# Patient Record
Sex: FEMALE | Race: WHITE | NOT HISPANIC OR LATINO | URBAN - METROPOLITAN AREA
[De-identification: names, ages, dates, MRNs, and addresses within clinical notes are randomized per-mention and may not be internally consistent; named-entity substitution may affect disease eponyms.]

---

## 2017-02-13 ENCOUNTER — INPATIENT (INPATIENT)
Facility: HOSPITAL | Age: 57
LOS: 0 days | Discharge: HOME | DRG: 392 | End: 2017-02-14
Attending: INTERNAL MEDICINE | Admitting: INTERNAL MEDICINE
Payer: COMMERCIAL

## 2017-02-13 VITALS — SYSTOLIC BLOOD PRESSURE: 154 MMHG | HEART RATE: 110 BPM | RESPIRATION RATE: 20 BRPM | DIASTOLIC BLOOD PRESSURE: 70 MMHG

## 2017-02-13 DIAGNOSIS — R10.13 EPIGASTRIC PAIN: ICD-10-CM

## 2017-02-13 DIAGNOSIS — Z90.89 ACQUIRED ABSENCE OF OTHER ORGANS: Chronic | ICD-10-CM

## 2017-02-13 LAB
ALBUMIN SERPL ELPH-MCNC: 4.6 G/DL — SIGNIFICANT CHANGE UP (ref 3.3–5)
ALP SERPL-CCNC: 52 U/L — SIGNIFICANT CHANGE UP (ref 40–120)
ALT FLD-CCNC: 19 U/L RC — SIGNIFICANT CHANGE UP (ref 10–45)
ANION GAP SERPL CALC-SCNC: 16 MMOL/L — SIGNIFICANT CHANGE UP (ref 5–17)
APTT BLD: 51.2 SEC — HIGH (ref 27.5–37.4)
AST SERPL-CCNC: 34 U/L — SIGNIFICANT CHANGE UP (ref 10–40)
BASOPHILS # BLD AUTO: 0 K/UL — SIGNIFICANT CHANGE UP (ref 0–0.2)
BASOPHILS NFR BLD AUTO: 0.8 % — SIGNIFICANT CHANGE UP (ref 0–2)
BILIRUB SERPL-MCNC: 0.4 MG/DL — SIGNIFICANT CHANGE UP (ref 0.2–1.2)
BUN SERPL-MCNC: 17 MG/DL — SIGNIFICANT CHANGE UP (ref 7–23)
CALCIUM SERPL-MCNC: 9.6 MG/DL — SIGNIFICANT CHANGE UP (ref 8.4–10.5)
CHLORIDE SERPL-SCNC: 103 MMOL/L — SIGNIFICANT CHANGE UP (ref 96–108)
CK MB BLD-MCNC: 0.8 % — SIGNIFICANT CHANGE UP (ref 0–3.5)
CK MB CFR SERPL CALC: 1.1 NG/ML — SIGNIFICANT CHANGE UP (ref 0–3.8)
CK SERPL-CCNC: 139 U/L — SIGNIFICANT CHANGE UP (ref 25–170)
CO2 SERPL-SCNC: 24 MMOL/L — SIGNIFICANT CHANGE UP (ref 22–31)
CREAT SERPL-MCNC: 0.68 MG/DL — SIGNIFICANT CHANGE UP (ref 0.5–1.3)
EOSINOPHIL # BLD AUTO: 0.1 K/UL — SIGNIFICANT CHANGE UP (ref 0–0.5)
EOSINOPHIL NFR BLD AUTO: 1.1 % — SIGNIFICANT CHANGE UP (ref 0–6)
GAS PNL BLDV: SIGNIFICANT CHANGE UP
GLUCOSE SERPL-MCNC: 83 MG/DL — SIGNIFICANT CHANGE UP (ref 70–99)
HCT VFR BLD CALC: 42.1 % — SIGNIFICANT CHANGE UP (ref 34.5–45)
HGB BLD-MCNC: 14.4 G/DL — SIGNIFICANT CHANGE UP (ref 11.5–15.5)
INR BLD: 1.03 RATIO — SIGNIFICANT CHANGE UP (ref 0.88–1.16)
LIDOCAIN IGE QN: 51 U/L — SIGNIFICANT CHANGE UP (ref 7–60)
LYMPHOCYTES # BLD AUTO: 1.7 K/UL — SIGNIFICANT CHANGE UP (ref 1–3.3)
LYMPHOCYTES # BLD AUTO: 31.5 % — SIGNIFICANT CHANGE UP (ref 13–44)
MCHC RBC-ENTMCNC: 30.8 PG — SIGNIFICANT CHANGE UP (ref 27–34)
MCHC RBC-ENTMCNC: 34.2 GM/DL — SIGNIFICANT CHANGE UP (ref 32–36)
MCV RBC AUTO: 90 FL — SIGNIFICANT CHANGE UP (ref 80–100)
MONOCYTES # BLD AUTO: 0.3 K/UL — SIGNIFICANT CHANGE UP (ref 0–0.9)
MONOCYTES NFR BLD AUTO: 6.1 % — SIGNIFICANT CHANGE UP (ref 2–14)
NEUTROPHILS # BLD AUTO: 3.3 K/UL — SIGNIFICANT CHANGE UP (ref 1.8–7.4)
NEUTROPHILS NFR BLD AUTO: 60.4 % — SIGNIFICANT CHANGE UP (ref 43–77)
PLATELET # BLD AUTO: 200 K/UL — SIGNIFICANT CHANGE UP (ref 150–400)
POTASSIUM SERPL-MCNC: 5.5 MMOL/L — HIGH (ref 3.5–5.3)
POTASSIUM SERPL-SCNC: 5.5 MMOL/L — HIGH (ref 3.5–5.3)
PROT SERPL-MCNC: 7.8 G/DL — SIGNIFICANT CHANGE UP (ref 6–8.3)
PROTHROM AB SERPL-ACNC: 11.2 SEC — SIGNIFICANT CHANGE UP (ref 10–13.1)
RBC # BLD: 4.68 M/UL — SIGNIFICANT CHANGE UP (ref 3.8–5.2)
RBC # FLD: 11.8 % — SIGNIFICANT CHANGE UP (ref 10.3–14.5)
SODIUM SERPL-SCNC: 143 MMOL/L — SIGNIFICANT CHANGE UP (ref 135–145)
TROPONIN T SERPL-MCNC: <0.01 NG/ML — SIGNIFICANT CHANGE UP (ref 0–0.06)
WBC # BLD: 5.4 K/UL — SIGNIFICANT CHANGE UP (ref 3.8–10.5)
WBC # FLD AUTO: 5.4 K/UL — SIGNIFICANT CHANGE UP (ref 3.8–10.5)

## 2017-02-13 PROCEDURE — 71275 CT ANGIOGRAPHY CHEST: CPT | Mod: 26

## 2017-02-13 PROCEDURE — 74174 CTA ABD&PLVS W/CONTRAST: CPT | Mod: 26

## 2017-02-13 PROCEDURE — 99285 EMERGENCY DEPT VISIT HI MDM: CPT

## 2017-02-13 PROCEDURE — 71020: CPT | Mod: 26

## 2017-02-13 RX ORDER — SODIUM CHLORIDE 9 MG/ML
3 INJECTION INTRAMUSCULAR; INTRAVENOUS; SUBCUTANEOUS ONCE
Qty: 0 | Refills: 0 | Status: COMPLETED | OUTPATIENT
Start: 2017-02-13 | End: 2017-02-13

## 2017-02-13 RX ADMIN — Medication 1 MILLIGRAM(S): at 20:55

## 2017-02-13 RX ADMIN — SODIUM CHLORIDE 3 MILLILITER(S): 9 INJECTION INTRAMUSCULAR; INTRAVENOUS; SUBCUTANEOUS at 19:00

## 2017-02-13 NOTE — ED ADULT NURSE NOTE - FAMILY HISTORY
Father  Still living? Unknown  Family history of hypertension, Age at diagnosis: Age Unknown     Sibling  Still living? Yes, Estimated age: Age Unknown  Family history of lung cancer, Age at diagnosis: Age Unknown

## 2017-02-13 NOTE — ED PROVIDER NOTE - OBJECTIVE STATEMENT
PMD Alejandro Bingham    56 year old female otherwise healthy (hx of pericarditis 1 yr ago) presenting with substernal /  epigastric abdominal pain started suddenly 2 pm, moving lower down and became less bad at appx 5 pm. No hx of htn. No weakness of arms or legs. No sob. Non-exertional. Non-pleuritic. No fever or chills. Does have dry cough for a while. Has had lung and liver nodules that were watched. ?stool caliber changes. No blood / melena. PMD Alejandro Bingham    56 year old female otherwise healthy (hx of pericarditis 1 yr ago) presenting with substernal /  epigastric abdominal pain started suddenly 2 pm, moving lower down and became less bad at appx 5 pm. No hx of htn. No weakness of arms or legs. No sob. Non-exertional. Non-pleuritic. No fever or chills. Does have dry cough for a while. Has had lung and liver nodules that were watched. No stool caliber changes. No blood / melena.

## 2017-02-13 NOTE — ED ADULT NURSE NOTE - OBJECTIVE STATEMENT
56 year old female c/o epigastric abdominal pain started about 2pm, pain radiating lower down. Pain lessened  at approximately 5 pm.  No sob.  No fever or chills. Pt c/o dry cough for a while. No n/v/d.  Abd soft.  Extremities mobile.  No acute respiratory distress noted.

## 2017-02-13 NOTE — ED PROVIDER NOTE - PROGRESS NOTE DETAILS
Dr. Sampson received signout on this patient - cta negative - spoke with Dr. Bingham - asked for this patient to be admitted to his service. Xiang Sampson M.D.

## 2017-02-13 NOTE — ED PROVIDER NOTE - ATTENDING CONTRIBUTION TO CARE
56 year old female otherwise healthy (hx of pericarditis 1 yr ago) presenting with substernal /  epigastric abdominal pain started suddenly 2 pm, moving lower down and became less bad at appx 5 pm. No hx of htn. No weakness of arms or legs. No sob. Non-exertional. Non-pleuritic. No fever or chills. Does have dry cough for a while. Has had lung and liver nodules that were watched. No stool caliber changes. No blood / melena.  will get iv, labs, ce, lipase, analgesia prn, cxr, ct c/a/p and reassess  will delta trop, noted neg cta and patient informed and given results printed out.  Admission is offered to the patient, however the patient declines.  They would like to go home and follow up with their private physician.  No immediate life threatening issues present on history or clinical exam. Patient is a safe disposition home, has capacity and insight into their condition, ambulatory in the emergency department and will follow up with their doctor(s) this week. Patient and family  understand anticipatory guidance were given strict return and follow up precautions.  The patient and family have been informed of all concerning signs and symptoms to return to Emergency Department, the necessity to follow up with PMD/Clinic/follow up provided within 2-3 days was explained, and the patient and/or family reports understanding of above with capacity and insight.

## 2017-02-13 NOTE — ED PROVIDER NOTE - PHYSICAL EXAMINATION
Gen: well appearing, of stated age, no acute distress; Head: NC, AT; ENT: MMM, no uvular deviation; Neck: supple with full ROM; Chest: CTAB, no retractions, rate normal, appears to breath comfortable; Heart: RRR S1S2 No JVD No peripheral edema b/l pulses 2+ in arms and legs; Abd: Soft epigastric tender, no rebound or guarding, no masses, no pulsations, no bhatti sign, no mcburney tenderness, no CVAT; Back: No spinal deformity; Ext: Moving all 4 limbs without obvious impairment to ROM, no obvious weakness; Neuro: fluid speech, no focal deficits; Psych: No anxiety, depression or pressured speech noted; Skin: no utricaria, no diffuse rash. -ncohen

## 2017-02-13 NOTE — ED PROVIDER NOTE - MEDICAL DECISION MAKING DETAILS
concern for acs vs ao dissection. will check ct/cta. Pt is low risk for pe. If imaging wnl and ekg OK will consider cdu vs 4 hr trop. NL echo 1 yr ago, per pt also nl stress test at that time.

## 2017-02-13 NOTE — ED PROVIDER NOTE - PLAN OF CARE
Follow up with the Gastroenterology follow up provided, Gastroenterology [phone]. Follow up with the Gastroenterology follow up provided, Gastroenterology [phone].  Call Dr. Alejandro Bingham and arrange a follow up appointment this week, [phone].  Follow up with your medical doctor in 2-3 days or call our clinic at [phone] and state you were seen in the Emergency Department and would like to be seen in clinic.   Take Tylenol 1g every six hours and supplement with ibuprofen 600mg, with food, every six hours which can be taken three hours apart from the Tylenol to have a layered effect.  Drink at least 2 Liters or 64 Ounces of water each day.  Return for any persistent, worsening symptoms, or ANY concerns at all.

## 2017-02-13 NOTE — ED PROVIDER NOTE - CARE PLAN
Principal Discharge DX:	Epigastric pain  Secondary Diagnosis:	Chest pain Principal Discharge DX:	Epigastric pain  Instructions for follow-up, activity and diet:	Follow up with the Gastroenterology follow up provided, Gastroenterology [phone].  Secondary Diagnosis:	Chest pain Principal Discharge DX:	Epigastric pain  Instructions for follow-up, activity and diet:	Follow up with the Gastroenterology follow up provided, Gastroenterology [phone].  Call Dr. Alejandro Bingham and arrange a follow up appointment this week, [phone].  Follow up with your medical doctor in 2-3 days or call our clinic at [phone] and state you were seen in the Emergency Department and would like to be seen in clinic.   Take Tylenol 1g every six hours and supplement with ibuprofen 600mg, with food, every six hours which can be taken three hours apart from the Tylenol to have a layered effect.  Drink at least 2 Liters or 64 Ounces of water each day.  Return for any persistent, worsening symptoms, or ANY concerns at all.  Secondary Diagnosis:	Chest pain Principal Discharge DX:	Epigastric pain  Instructions for follow-up, activity and diet:	Follow up with the Gastroenterology follow up provided, Gastroenterology [phone].  Call Dr. Alejandro Bingham and arrange a follow up appointment this week, [phone].  Follow up with your medical doctor in 2-3 days or call our clinic at [phone] and state you were seen in the Emergency Department and would like to be seen in clinic.   Take Tylenol 1g every six hours and supplement with ibuprofen 600mg, with food, every six hours which can be taken three hours apart from the Tylenol to have a layered effect.  Drink at least 2 Liters or 64 Ounces of water each day.  Return for any persistent, worsening symptoms, or ANY concerns at all.  Secondary Diagnosis:	Chest pain Principal Discharge DX:	Epigastric pain  Instructions for follow-up, activity and diet:	Follow up with the Gastroenterology follow up provided, Gastroenterology [phone].  Call Dr. Alejandro Bingham and arrange a follow up appointment this week, [phone].  Follow up with your medical doctor in 2-3 days or call our clinic at [phone] and state you were seen in the Emergency Department and would like to be seen in clinic.   Take Tylenol 1g every six hours and supplement with ibuprofen 600mg, with food, every six hours which can be taken three hours apart from the Tylenol to have a layered effect.  Drink at least 2 Liters or 64 Ounces of water each day.  Return for any persistent, worsening symptoms, or ANY concerns at all.  Secondary Diagnosis:	Chest pain

## 2017-02-14 VITALS
RESPIRATION RATE: 18 BRPM | DIASTOLIC BLOOD PRESSURE: 83 MMHG | TEMPERATURE: 98 F | SYSTOLIC BLOOD PRESSURE: 118 MMHG | HEART RATE: 104 BPM | OXYGEN SATURATION: 96 %

## 2017-02-14 LAB
APPEARANCE UR: CLEAR — SIGNIFICANT CHANGE UP
BILIRUB UR-MCNC: NEGATIVE — SIGNIFICANT CHANGE UP
COLOR SPEC: YELLOW — SIGNIFICANT CHANGE UP
COMMENT - URINE: SIGNIFICANT CHANGE UP
DIFF PNL FLD: NEGATIVE — SIGNIFICANT CHANGE UP
EPI CELLS # UR: SIGNIFICANT CHANGE UP /HPF
GLUCOSE UR QL: NEGATIVE — SIGNIFICANT CHANGE UP
KETONES UR-MCNC: NEGATIVE — SIGNIFICANT CHANGE UP
LEUKOCYTE ESTERASE UR-ACNC: NEGATIVE — SIGNIFICANT CHANGE UP
NITRITE UR-MCNC: NEGATIVE — SIGNIFICANT CHANGE UP
PH UR: 7 — SIGNIFICANT CHANGE UP (ref 4.8–8)
PROT UR-MCNC: NEGATIVE — SIGNIFICANT CHANGE UP
SP GR SPEC: 1.01 — SIGNIFICANT CHANGE UP (ref 1.01–1.02)
TROPONIN T SERPL-MCNC: <0.01 NG/ML — SIGNIFICANT CHANGE UP (ref 0–0.06)
UROBILINOGEN FLD QL: NEGATIVE — SIGNIFICANT CHANGE UP
WBC UR QL: SIGNIFICANT CHANGE UP /HPF (ref 0–5)

## 2017-02-14 PROCEDURE — 85027 COMPLETE CBC AUTOMATED: CPT

## 2017-02-14 PROCEDURE — 71046 X-RAY EXAM CHEST 2 VIEWS: CPT

## 2017-02-14 PROCEDURE — 85730 THROMBOPLASTIN TIME PARTIAL: CPT

## 2017-02-14 PROCEDURE — 82553 CREATINE MB FRACTION: CPT

## 2017-02-14 PROCEDURE — 99284 EMERGENCY DEPT VISIT MOD MDM: CPT | Mod: 25

## 2017-02-14 PROCEDURE — 71275 CT ANGIOGRAPHY CHEST: CPT

## 2017-02-14 PROCEDURE — 93005 ELECTROCARDIOGRAM TRACING: CPT

## 2017-02-14 PROCEDURE — 81001 URINALYSIS AUTO W/SCOPE: CPT

## 2017-02-14 PROCEDURE — 84484 ASSAY OF TROPONIN QUANT: CPT

## 2017-02-14 PROCEDURE — 84132 ASSAY OF SERUM POTASSIUM: CPT

## 2017-02-14 PROCEDURE — 82330 ASSAY OF CALCIUM: CPT

## 2017-02-14 PROCEDURE — 82435 ASSAY OF BLOOD CHLORIDE: CPT

## 2017-02-14 PROCEDURE — 84295 ASSAY OF SERUM SODIUM: CPT

## 2017-02-14 PROCEDURE — 85610 PROTHROMBIN TIME: CPT

## 2017-02-14 PROCEDURE — 74174 CTA ABD&PLVS W/CONTRAST: CPT

## 2017-02-14 PROCEDURE — 83690 ASSAY OF LIPASE: CPT

## 2017-02-14 PROCEDURE — 82550 ASSAY OF CK (CPK): CPT

## 2017-02-14 PROCEDURE — 82803 BLOOD GASES ANY COMBINATION: CPT

## 2017-02-14 PROCEDURE — 83605 ASSAY OF LACTIC ACID: CPT

## 2017-02-14 PROCEDURE — 85014 HEMATOCRIT: CPT

## 2017-02-14 PROCEDURE — 82947 ASSAY GLUCOSE BLOOD QUANT: CPT

## 2017-02-14 PROCEDURE — 80053 COMPREHEN METABOLIC PANEL: CPT

## 2017-02-14 PROCEDURE — 96374 THER/PROPH/DIAG INJ IV PUSH: CPT | Mod: XU

## 2017-05-15 ENCOUNTER — APPOINTMENT (OUTPATIENT)
Dept: CARDIOLOGY | Facility: CLINIC | Age: 57
End: 2017-05-15

## 2017-05-15 ENCOUNTER — NON-APPOINTMENT (OUTPATIENT)
Age: 57
End: 2017-05-15

## 2017-05-15 VITALS
DIASTOLIC BLOOD PRESSURE: 80 MMHG | HEIGHT: 62 IN | HEART RATE: 77 BPM | BODY MASS INDEX: 19.32 KG/M2 | WEIGHT: 105 LBS | SYSTOLIC BLOOD PRESSURE: 119 MMHG | OXYGEN SATURATION: 98 %

## 2017-05-15 DIAGNOSIS — Z87.898 PERSONAL HISTORY OF OTHER SPECIFIED CONDITIONS: ICD-10-CM

## 2017-05-20 LAB
ALBUMIN SERPL ELPH-MCNC: 4.4 G/DL
ALP BLD-CCNC: 54 U/L
ALT SERPL-CCNC: 17 U/L
ANION GAP SERPL CALC-SCNC: 15 MMOL/L
AST SERPL-CCNC: 23 U/L
BASOPHILS # BLD AUTO: 0.02 K/UL
BASOPHILS NFR BLD AUTO: 0.4 %
BILIRUB SERPL-MCNC: 0.3 MG/DL
BUN SERPL-MCNC: 17 MG/DL
CALCIUM SERPL-MCNC: 9.6 MG/DL
CHLORIDE SERPL-SCNC: 101 MMOL/L
CHOLEST SERPL-MCNC: 209 MG/DL
CHOLEST/HDLC SERPL: 3.1 RATIO
CO2 SERPL-SCNC: 26 MMOL/L
CREAT SERPL-MCNC: 0.77 MG/DL
EOSINOPHIL # BLD AUTO: 0.1 K/UL
EOSINOPHIL NFR BLD AUTO: 2.2 %
GLUCOSE SERPL-MCNC: 72 MG/DL
HBA1C MFR BLD HPLC: 5.6 %
HCT VFR BLD CALC: 38.9 %
HDLC SERPL-MCNC: 68 MG/DL
HGB BLD-MCNC: 13 G/DL
IMM GRANULOCYTES NFR BLD AUTO: 0.2 %
LDLC SERPL CALC-MCNC: 127 MG/DL
LYMPHOCYTES # BLD AUTO: 1.83 K/UL
LYMPHOCYTES NFR BLD AUTO: 39.4 %
MAN DIFF?: NORMAL
MCHC RBC-ENTMCNC: 29.7 PG
MCHC RBC-ENTMCNC: 33.4 GM/DL
MCV RBC AUTO: 89 FL
MONOCYTES # BLD AUTO: 0.38 K/UL
MONOCYTES NFR BLD AUTO: 8.2 %
NEUTROPHILS # BLD AUTO: 2.3 K/UL
NEUTROPHILS NFR BLD AUTO: 49.6 %
PLATELET # BLD AUTO: 218 K/UL
POTASSIUM SERPL-SCNC: 4.1 MMOL/L
PROT SERPL-MCNC: 6.9 G/DL
RBC # BLD: 4.37 M/UL
RBC # FLD: 12.4 %
SODIUM SERPL-SCNC: 142 MMOL/L
TRIGL SERPL-MCNC: 69 MG/DL
TSH SERPL-ACNC: 2.01 UIU/ML
WBC # FLD AUTO: 4.64 K/UL

## 2017-05-24 ENCOUNTER — OUTPATIENT (OUTPATIENT)
Dept: OUTPATIENT SERVICES | Facility: HOSPITAL | Age: 57
LOS: 1 days | End: 2017-05-24
Payer: COMMERCIAL

## 2017-05-24 ENCOUNTER — APPOINTMENT (OUTPATIENT)
Dept: CV DIAGNOSITCS | Facility: HOSPITAL | Age: 57
End: 2017-05-24

## 2017-05-24 DIAGNOSIS — R07.9 CHEST PAIN, UNSPECIFIED: ICD-10-CM

## 2017-05-24 DIAGNOSIS — Z90.89 ACQUIRED ABSENCE OF OTHER ORGANS: Chronic | ICD-10-CM

## 2017-05-24 PROCEDURE — 93306 TTE W/DOPPLER COMPLETE: CPT | Mod: 26

## 2017-05-24 PROCEDURE — 93306 TTE W/DOPPLER COMPLETE: CPT

## 2017-05-26 ENCOUNTER — APPOINTMENT (OUTPATIENT)
Dept: CV DIAGNOSTICS | Facility: HOSPITAL | Age: 57
End: 2017-05-26

## 2017-08-03 ENCOUNTER — NON-APPOINTMENT (OUTPATIENT)
Age: 57
End: 2017-08-03

## 2017-08-03 ENCOUNTER — APPOINTMENT (OUTPATIENT)
Dept: CARDIOLOGY | Facility: CLINIC | Age: 57
End: 2017-08-03
Payer: COMMERCIAL

## 2017-08-03 VITALS
DIASTOLIC BLOOD PRESSURE: 83 MMHG | SYSTOLIC BLOOD PRESSURE: 119 MMHG | HEIGHT: 62 IN | BODY MASS INDEX: 19.88 KG/M2 | WEIGHT: 108 LBS | HEART RATE: 90 BPM | OXYGEN SATURATION: 97 %

## 2017-08-03 PROCEDURE — 99215 OFFICE O/P EST HI 40 MIN: CPT

## 2017-08-03 PROCEDURE — 93000 ELECTROCARDIOGRAM COMPLETE: CPT

## 2017-08-16 ENCOUNTER — OTHER (OUTPATIENT)
Age: 57
End: 2017-08-16

## 2017-08-21 ENCOUNTER — APPOINTMENT (OUTPATIENT)
Dept: CV DIAGNOSITCS | Facility: HOSPITAL | Age: 57
End: 2017-08-21

## 2017-08-21 ENCOUNTER — OUTPATIENT (OUTPATIENT)
Dept: OUTPATIENT SERVICES | Facility: HOSPITAL | Age: 57
LOS: 1 days | End: 2017-08-21
Payer: COMMERCIAL

## 2017-08-21 DIAGNOSIS — R07.9 CHEST PAIN, UNSPECIFIED: ICD-10-CM

## 2017-08-21 DIAGNOSIS — Z90.89 ACQUIRED ABSENCE OF OTHER ORGANS: Chronic | ICD-10-CM

## 2017-08-21 PROCEDURE — 93016 CV STRESS TEST SUPVJ ONLY: CPT

## 2017-08-21 PROCEDURE — 93351 STRESS TTE COMPLETE: CPT

## 2017-08-21 PROCEDURE — 93350 STRESS TTE ONLY: CPT | Mod: 26

## 2017-08-21 PROCEDURE — 93320 DOPPLER ECHO COMPLETE: CPT | Mod: 26

## 2017-08-21 PROCEDURE — 93320 DOPPLER ECHO COMPLETE: CPT

## 2017-08-21 PROCEDURE — 93018 CV STRESS TEST I&R ONLY: CPT

## 2017-08-21 PROCEDURE — 93325 DOPPLER ECHO COLOR FLOW MAPG: CPT

## 2017-08-21 PROCEDURE — 93325 DOPPLER ECHO COLOR FLOW MAPG: CPT | Mod: 26

## 2018-10-11 ENCOUNTER — APPOINTMENT (OUTPATIENT)
Dept: CARDIOLOGY | Facility: CLINIC | Age: 58
End: 2018-10-11
Payer: COMMERCIAL

## 2018-10-11 ENCOUNTER — NON-APPOINTMENT (OUTPATIENT)
Age: 58
End: 2018-10-11

## 2018-10-11 VITALS
BODY MASS INDEX: 19.32 KG/M2 | SYSTOLIC BLOOD PRESSURE: 136 MMHG | WEIGHT: 105 LBS | OXYGEN SATURATION: 98 % | HEIGHT: 62 IN | HEART RATE: 80 BPM | DIASTOLIC BLOOD PRESSURE: 72 MMHG

## 2018-10-11 PROCEDURE — 93000 ELECTROCARDIOGRAM COMPLETE: CPT

## 2018-10-11 PROCEDURE — 99214 OFFICE O/P EST MOD 30 MIN: CPT

## 2019-10-31 ENCOUNTER — APPOINTMENT (OUTPATIENT)
Dept: CARDIOLOGY | Facility: CLINIC | Age: 59
End: 2019-10-31
Payer: COMMERCIAL

## 2019-10-31 ENCOUNTER — NON-APPOINTMENT (OUTPATIENT)
Age: 59
End: 2019-10-31

## 2019-10-31 VITALS — OXYGEN SATURATION: 99 % | HEART RATE: 69 BPM | DIASTOLIC BLOOD PRESSURE: 75 MMHG | SYSTOLIC BLOOD PRESSURE: 125 MMHG

## 2019-10-31 PROCEDURE — 99214 OFFICE O/P EST MOD 30 MIN: CPT

## 2019-10-31 PROCEDURE — 93000 ELECTROCARDIOGRAM COMPLETE: CPT

## 2019-10-31 NOTE — DISCUSSION/SUMMARY
[FreeTextEntry1] : Patient is a 59 yo woman with h/o pericarditis (on medical therapy - years ago), intracranial bleed after an injury,  presented to follow up.\par - will refer for an echocardiogram\par - will refer for routine blood work\par - the patient was advised to keep herself hydrated\par - resutls of the stress echo were d/w the patient  - it appears that she has cardiac deconditioning. Advised to start HIIT training\par - Blood pressure is well controlled\par - ECG with no ischemic findings

## 2019-10-31 NOTE — HISTORY OF PRESENT ILLNESS
[FreeTextEntry1] : Patient is a 58 yo woman with h/o pericarditis (on medical therapy - years ago), intracranial bleed after an injury,  presented to follow up.\par \par Presently feels well - denies any new cardiac symtpoms at rest. When she performs high intensity cardiovascular exercise, she starts to have dizziness and palpitations. Patient does not keep herself well hydrated since she has been having bladder issues. Otherwise, does her strength exercise and yoga with no issues.

## 2019-10-31 NOTE — PHYSICAL EXAM
[General Appearance - Well Developed] : well developed [Normal Appearance] : normal appearance [Well Groomed] : well groomed [General Appearance - Well Nourished] : well nourished [No Deformities] : no deformities [General Appearance - In No Acute Distress] : no acute distress [Normal Conjunctiva] : the conjunctiva exhibited no abnormalities [Eyelids - No Xanthelasma] : the eyelids demonstrated no xanthelasmas [Normal Oral Mucosa] : normal oral mucosa [No Oral Pallor] : no oral pallor [No Oral Cyanosis] : no oral cyanosis [Normal Jugular Venous A Waves Present] : normal jugular venous A waves present [Normal Jugular Venous V Waves Present] : normal jugular venous V waves present [No Jugular Venous Carrasco A Waves] : no jugular venous carrasco A waves [Respiration, Rhythm And Depth] : normal respiratory rhythm and effort [Exaggerated Use Of Accessory Muscles For Inspiration] : no accessory muscle use [Auscultation Breath Sounds / Voice Sounds] : lungs were clear to auscultation bilaterally [Heart Rate And Rhythm] : heart rate and rhythm were normal [Heart Sounds] : normal S1 and S2 [Murmurs] : no murmurs present [Abdomen Soft] : soft [Abdomen Tenderness] : non-tender [Abdomen Mass (___ Cm)] : no abdominal mass palpated [Abnormal Walk] : normal gait [Gait - Sufficient For Exercise Testing] : the gait was sufficient for exercise testing [Nail Clubbing] : no clubbing of the fingernails [Cyanosis, Localized] : no localized cyanosis [Petechial Hemorrhages (___cm)] : no petechial hemorrhages [Skin Color & Pigmentation] : normal skin color and pigmentation [] : no rash [No Venous Stasis] : no venous stasis [Skin Lesions] : no skin lesions [No Skin Ulcers] : no skin ulcer [No Xanthoma] : no  xanthoma was observed [Oriented To Time, Place, And Person] : oriented to person, place, and time [Affect] : the affect was normal [Mood] : the mood was normal [No Anxiety] : not feeling anxious

## 2019-10-31 NOTE — REVIEW OF SYSTEMS
[Chest  Pressure] : chest pressure [Chest Pain] : chest pain [Palpitations] : palpitations [Negative] : Heme/Lymph

## 2019-11-01 LAB
ALBUMIN SERPL ELPH-MCNC: 4.5 G/DL
ALP BLD-CCNC: 60 U/L
ALT SERPL-CCNC: 10 U/L
ANION GAP SERPL CALC-SCNC: 10 MMOL/L
AST SERPL-CCNC: 17 U/L
BASOPHILS # BLD AUTO: 0.04 K/UL
BASOPHILS NFR BLD AUTO: 1.1 %
BILIRUB SERPL-MCNC: 0.6 MG/DL
BUN SERPL-MCNC: 23 MG/DL
CALCIUM SERPL-MCNC: 9.5 MG/DL
CHLORIDE SERPL-SCNC: 103 MMOL/L
CHOLEST SERPL-MCNC: 215 MG/DL
CHOLEST/HDLC SERPL: 2.8 RATIO
CO2 SERPL-SCNC: 28 MMOL/L
CREAT SERPL-MCNC: 0.62 MG/DL
EOSINOPHIL # BLD AUTO: 0.09 K/UL
EOSINOPHIL NFR BLD AUTO: 2.4 %
ESTIMATED AVERAGE GLUCOSE: 108 MG/DL
GLUCOSE SERPL-MCNC: 77 MG/DL
HBA1C MFR BLD HPLC: 5.4 %
HCT VFR BLD CALC: 40.7 %
HDLC SERPL-MCNC: 77 MG/DL
HGB BLD-MCNC: 13 G/DL
IMM GRANULOCYTES NFR BLD AUTO: 0.3 %
LDLC SERPL CALC-MCNC: 128 MG/DL
LYMPHOCYTES # BLD AUTO: 1.42 K/UL
LYMPHOCYTES NFR BLD AUTO: 37.6 %
MAN DIFF?: NORMAL
MCHC RBC-ENTMCNC: 29.5 PG
MCHC RBC-ENTMCNC: 31.9 GM/DL
MCV RBC AUTO: 92.5 FL
MONOCYTES # BLD AUTO: 0.35 K/UL
MONOCYTES NFR BLD AUTO: 9.3 %
NEUTROPHILS # BLD AUTO: 1.87 K/UL
NEUTROPHILS NFR BLD AUTO: 49.3 %
PLATELET # BLD AUTO: 226 K/UL
POTASSIUM SERPL-SCNC: 3.9 MMOL/L
PROT SERPL-MCNC: 6.8 G/DL
RBC # BLD: 4.4 M/UL
RBC # FLD: 12.2 %
SODIUM SERPL-SCNC: 141 MMOL/L
TRIGL SERPL-MCNC: 50 MG/DL
TSH SERPL-ACNC: 2.41 UIU/ML
WBC # FLD AUTO: 3.78 K/UL

## 2019-11-20 ENCOUNTER — OUTPATIENT (OUTPATIENT)
Dept: OUTPATIENT SERVICES | Facility: HOSPITAL | Age: 59
LOS: 1 days | End: 2019-11-20
Payer: COMMERCIAL

## 2019-11-20 ENCOUNTER — APPOINTMENT (OUTPATIENT)
Dept: CV DIAGNOSITCS | Facility: HOSPITAL | Age: 59
End: 2019-11-20

## 2019-11-20 DIAGNOSIS — Z90.89 ACQUIRED ABSENCE OF OTHER ORGANS: Chronic | ICD-10-CM

## 2019-11-20 DIAGNOSIS — R07.9 CHEST PAIN, UNSPECIFIED: ICD-10-CM

## 2019-11-20 PROCEDURE — 93306 TTE W/DOPPLER COMPLETE: CPT

## 2019-11-20 PROCEDURE — 93306 TTE W/DOPPLER COMPLETE: CPT | Mod: 26

## 2020-09-23 ENCOUNTER — APPOINTMENT (OUTPATIENT)
Dept: CV DIAGNOSITCS | Facility: HOSPITAL | Age: 60
End: 2020-09-23

## 2020-09-23 ENCOUNTER — OUTPATIENT (OUTPATIENT)
Dept: OUTPATIENT SERVICES | Facility: HOSPITAL | Age: 60
LOS: 1 days | End: 2020-09-23
Payer: COMMERCIAL

## 2020-09-23 DIAGNOSIS — Z90.89 ACQUIRED ABSENCE OF OTHER ORGANS: Chronic | ICD-10-CM

## 2020-09-23 DIAGNOSIS — I31.3 PERICARDIAL EFFUSION (NONINFLAMMATORY): ICD-10-CM

## 2020-09-23 PROCEDURE — 93306 TTE W/DOPPLER COMPLETE: CPT | Mod: 26

## 2020-09-23 PROCEDURE — 93306 TTE W/DOPPLER COMPLETE: CPT

## 2020-10-07 ENCOUNTER — APPOINTMENT (OUTPATIENT)
Dept: CARDIOLOGY | Facility: CLINIC | Age: 60
End: 2020-10-07
Payer: COMMERCIAL

## 2020-10-07 VITALS
OXYGEN SATURATION: 97 % | BODY MASS INDEX: 19.32 KG/M2 | HEIGHT: 62 IN | HEART RATE: 90 BPM | SYSTOLIC BLOOD PRESSURE: 113 MMHG | WEIGHT: 105 LBS | DIASTOLIC BLOOD PRESSURE: 73 MMHG

## 2020-10-07 PROCEDURE — 93000 ELECTROCARDIOGRAM COMPLETE: CPT

## 2020-10-07 PROCEDURE — 99215 OFFICE O/P EST HI 40 MIN: CPT

## 2020-10-07 NOTE — PHYSICAL EXAM
[General Appearance - Well Developed] : well developed [Normal Appearance] : normal appearance [Well Groomed] : well groomed [General Appearance - Well Nourished] : well nourished [No Deformities] : no deformities [General Appearance - In No Acute Distress] : no acute distress [Normal Conjunctiva] : the conjunctiva exhibited no abnormalities [Eyelids - No Xanthelasma] : the eyelids demonstrated no xanthelasmas [Normal Oral Mucosa] : normal oral mucosa [No Oral Pallor] : no oral pallor [No Oral Cyanosis] : no oral cyanosis [Normal Jugular Venous A Waves Present] : normal jugular venous A waves present [Normal Jugular Venous V Waves Present] : normal jugular venous V waves present [No Jugular Venous Carrasco A Waves] : no jugular venous carrasco A waves [Respiration, Rhythm And Depth] : normal respiratory rhythm and effort [Exaggerated Use Of Accessory Muscles For Inspiration] : no accessory muscle use [Auscultation Breath Sounds / Voice Sounds] : lungs were clear to auscultation bilaterally [Heart Rate And Rhythm] : heart rate and rhythm were normal [Heart Sounds] : normal S1 and S2 [Murmurs] : no murmurs present [Abdomen Soft] : soft [Abdomen Tenderness] : non-tender [Abdomen Mass (___ Cm)] : no abdominal mass palpated [Abnormal Walk] : normal gait [Gait - Sufficient For Exercise Testing] : the gait was sufficient for exercise testing [Nail Clubbing] : no clubbing of the fingernails [Cyanosis, Localized] : no localized cyanosis [Petechial Hemorrhages (___cm)] : no petechial hemorrhages [Skin Color & Pigmentation] : normal skin color and pigmentation [] : no rash [No Venous Stasis] : no venous stasis [Skin Lesions] : no skin lesions [No Skin Ulcers] : no skin ulcer [No Xanthoma] : no  xanthoma was observed [Oriented To Time, Place, And Person] : oriented to person, place, and time [Affect] : the affect was normal [Mood] : the mood was normal [No Anxiety] : not feeling anxious [Normal] : normal [Normal Rate] : normal [Rhythm Regular] : regular [Normal S1] : normal S1 [Normal S2] : normal S2

## 2020-10-08 LAB
ALBUMIN SERPL ELPH-MCNC: 4.7 G/DL
ALP BLD-CCNC: 41 U/L
ALT SERPL-CCNC: 11 U/L
ANION GAP SERPL CALC-SCNC: 12 MMOL/L
AST SERPL-CCNC: 16 U/L
BASOPHILS # BLD AUTO: 0.04 K/UL
BASOPHILS NFR BLD AUTO: 1 %
BILIRUB SERPL-MCNC: 0.7 MG/DL
BUN SERPL-MCNC: 21 MG/DL
CALCIUM SERPL-MCNC: 9.5 MG/DL
CHLORIDE SERPL-SCNC: 103 MMOL/L
CHOLEST SERPL-MCNC: 205 MG/DL
CHOLEST/HDLC SERPL: 2.7 RATIO
CO2 SERPL-SCNC: 27 MMOL/L
CREAT SERPL-MCNC: 0.59 MG/DL
EOSINOPHIL # BLD AUTO: 0.09 K/UL
EOSINOPHIL NFR BLD AUTO: 2.2 %
GLUCOSE SERPL-MCNC: 81 MG/DL
HCT VFR BLD CALC: 40.9 %
HDLC SERPL-MCNC: 76 MG/DL
HGB BLD-MCNC: 13.1 G/DL
IMM GRANULOCYTES NFR BLD AUTO: 0 %
LDLC SERPL CALC-MCNC: 118 MG/DL
LYMPHOCYTES # BLD AUTO: 1.48 K/UL
LYMPHOCYTES NFR BLD AUTO: 36.7 %
MAN DIFF?: NORMAL
MCHC RBC-ENTMCNC: 29.8 PG
MCHC RBC-ENTMCNC: 32 GM/DL
MCV RBC AUTO: 93.2 FL
MONOCYTES # BLD AUTO: 0.36 K/UL
MONOCYTES NFR BLD AUTO: 8.9 %
NEUTROPHILS # BLD AUTO: 2.06 K/UL
NEUTROPHILS NFR BLD AUTO: 51.2 %
PLATELET # BLD AUTO: 231 K/UL
POTASSIUM SERPL-SCNC: 4.1 MMOL/L
PROT SERPL-MCNC: 6.8 G/DL
RBC # BLD: 4.39 M/UL
RBC # FLD: 12.4 %
SODIUM SERPL-SCNC: 142 MMOL/L
TRIGL SERPL-MCNC: 60 MG/DL
TSH SERPL-ACNC: 3.12 UIU/ML
WBC # FLD AUTO: 4.03 K/UL

## 2020-10-08 NOTE — HISTORY OF PRESENT ILLNESS
[FreeTextEntry1] : Patient is a 61 yo woman with h/o pericarditis (on medical therapy - years ago), intracranial bleed after an injury,  presented to follow up.\par \par Presently feels well - occ feels SOB when she has chest pain/ discomfort especially when she is lying down relieved with Motrin. \par \par TTE - small pericardial effusion ( Prior reading was trace effusion 2017). \par Was advised to Motrin but has not started taking yet.

## 2020-10-08 NOTE — DISCUSSION/SUMMARY
[FreeTextEntry1] : Patient is a 61 yo woman with h/o pericarditis (on medical therapy - years ago), intracranial bleed after an injury,  presented to follow up. TTE with persistent pericardial effusion. - Blood pressure is well controlled\par - ECG with no ischemic findings\par \par Pericardial effusion - \par -  Motrin - 400mg bid x 3 days \par Motrin 200mg bid x 3 days \par Motrin 100mg bid x 3 days \par Motrin 100mg QD x 3 days \par \par 2) Follow up TTE to evaluate pericardial effusion in 4 weeks. \par  \par

## 2020-11-05 ENCOUNTER — OUTPATIENT (OUTPATIENT)
Dept: OUTPATIENT SERVICES | Facility: HOSPITAL | Age: 60
LOS: 1 days | End: 2020-11-05
Payer: COMMERCIAL

## 2020-11-05 ENCOUNTER — APPOINTMENT (OUTPATIENT)
Dept: CV DIAGNOSITCS | Facility: HOSPITAL | Age: 60
End: 2020-11-05

## 2020-11-05 DIAGNOSIS — Z90.89 ACQUIRED ABSENCE OF OTHER ORGANS: Chronic | ICD-10-CM

## 2020-11-05 DIAGNOSIS — I31.3 PERICARDIAL EFFUSION (NONINFLAMMATORY): ICD-10-CM

## 2020-11-05 PROCEDURE — 93306 TTE W/DOPPLER COMPLETE: CPT

## 2020-11-05 PROCEDURE — 93306 TTE W/DOPPLER COMPLETE: CPT | Mod: 26

## 2021-05-26 ENCOUNTER — NON-APPOINTMENT (OUTPATIENT)
Age: 61
End: 2021-05-26

## 2021-05-26 ENCOUNTER — APPOINTMENT (OUTPATIENT)
Dept: CARDIOLOGY | Facility: CLINIC | Age: 61
End: 2021-05-26

## 2021-05-26 ENCOUNTER — APPOINTMENT (OUTPATIENT)
Dept: CARDIOLOGY | Facility: CLINIC | Age: 61
End: 2021-05-26
Payer: COMMERCIAL

## 2021-05-26 VITALS
SYSTOLIC BLOOD PRESSURE: 126 MMHG | HEIGHT: 62 IN | BODY MASS INDEX: 19.88 KG/M2 | WEIGHT: 108 LBS | HEART RATE: 85 BPM | DIASTOLIC BLOOD PRESSURE: 84 MMHG | OXYGEN SATURATION: 97 %

## 2021-05-26 PROCEDURE — 93000 ELECTROCARDIOGRAM COMPLETE: CPT

## 2021-05-26 PROCEDURE — 99215 OFFICE O/P EST HI 40 MIN: CPT

## 2021-05-26 PROCEDURE — 99072 ADDL SUPL MATRL&STAF TM PHE: CPT

## 2021-05-26 NOTE — PHYSICAL EXAM
[General Appearance - Well Developed] : well developed [Normal Appearance] : normal appearance [Well Groomed] : well groomed [General Appearance - Well Nourished] : well nourished [No Deformities] : no deformities [General Appearance - In No Acute Distress] : no acute distress [Normal Conjunctiva] : the conjunctiva exhibited no abnormalities [Eyelids - No Xanthelasma] : the eyelids demonstrated no xanthelasmas [Normal Oral Mucosa] : normal oral mucosa [No Oral Pallor] : no oral pallor [No Oral Cyanosis] : no oral cyanosis [Normal Jugular Venous A Waves Present] : normal jugular venous A waves present [Normal Jugular Venous V Waves Present] : normal jugular venous V waves present [No Jugular Venous Carrasco A Waves] : no jugular venous carrasco A waves [Respiration, Rhythm And Depth] : normal respiratory rhythm and effort [Exaggerated Use Of Accessory Muscles For Inspiration] : no accessory muscle use [Auscultation Breath Sounds / Voice Sounds] : lungs were clear to auscultation bilaterally [Normal] : normal [Normal Rate] : normal [Rhythm Regular] : regular [Normal S1] : normal S1 [Normal S2] : normal S2 [Abdomen Soft] : soft [Abdomen Tenderness] : non-tender [Abdomen Mass (___ Cm)] : no abdominal mass palpated [Abnormal Walk] : normal gait [Gait - Sufficient For Exercise Testing] : the gait was sufficient for exercise testing [Nail Clubbing] : no clubbing of the fingernails [Cyanosis, Localized] : no localized cyanosis [Petechial Hemorrhages (___cm)] : no petechial hemorrhages [Skin Color & Pigmentation] : normal skin color and pigmentation [] : no rash [No Venous Stasis] : no venous stasis [Skin Lesions] : no skin lesions [No Skin Ulcers] : no skin ulcer [No Xanthoma] : no  xanthoma was observed [Oriented To Time, Place, And Person] : oriented to person, place, and time [Affect] : the affect was normal [Mood] : the mood was normal [No Anxiety] : not feeling anxious

## 2021-05-26 NOTE — DISCUSSION/SUMMARY
[FreeTextEntry1] : Patient is a 60 yo woman with h/o pericarditis (on medical therapy - years ago), intracranial bleed after an injury,  presented to follow up. TTE with persistent pericardial effusion. - Blood pressure is well controlled\par - ECG with no ischemic findings\par \par Pericardial effusion - \par -  Motrin - 400mg bid x 3 days \par Motrin 200mg bid x 3 days \par Motrin 100mg bid x 3 days \par Motrin 100mg QD x 3 days \par \par 2) Follow up TTE to evaluate pericardial effusion in 4 weeks. \par  \par

## 2021-05-26 NOTE — HISTORY OF PRESENT ILLNESS
[FreeTextEntry1] : Patient is a 60 yo woman with h/o pericarditis (on medical therapy - years ago), intracranial bleed after an injury,  presented to follow up.\par \par Presently feels well - occ feels SOB when she has chest pain/ discomfort especially when she is lying down relieved with Motrin. \par \par TTE - small pericardial effusion ( Prior reading was trace effusion 2017). \par Was advised to Motrin but has not started taking yet.

## 2021-12-09 ENCOUNTER — APPOINTMENT (OUTPATIENT)
Dept: ELECTROPHYSIOLOGY | Facility: CLINIC | Age: 61
End: 2021-12-09
Payer: COMMERCIAL

## 2021-12-09 ENCOUNTER — NON-APPOINTMENT (OUTPATIENT)
Age: 61
End: 2021-12-09

## 2021-12-09 ENCOUNTER — APPOINTMENT (OUTPATIENT)
Dept: CARDIOLOGY | Facility: CLINIC | Age: 61
End: 2021-12-09
Payer: COMMERCIAL

## 2021-12-09 VITALS
DIASTOLIC BLOOD PRESSURE: 87 MMHG | HEART RATE: 81 BPM | BODY MASS INDEX: 19.88 KG/M2 | SYSTOLIC BLOOD PRESSURE: 125 MMHG | WEIGHT: 108 LBS | OXYGEN SATURATION: 99 % | HEIGHT: 62 IN

## 2021-12-09 DIAGNOSIS — Z00.00 ENCOUNTER FOR GENERAL ADULT MEDICAL EXAMINATION W/OUT ABNORMAL FINDINGS: ICD-10-CM

## 2021-12-09 DIAGNOSIS — R03.0 ELEVATED BLOOD-PRESSURE READING, W/OUT DIAGNOSIS OF HYPERTENSION: ICD-10-CM

## 2021-12-09 PROCEDURE — 93000 ELECTROCARDIOGRAM COMPLETE: CPT

## 2021-12-09 PROCEDURE — 99213 OFFICE O/P EST LOW 20 MIN: CPT

## 2021-12-10 NOTE — DISCUSSION/SUMMARY
[FreeTextEntry1] :  61 year old woman with h/o pericarditis (on medical therapy - years ago), intracranial bleed after an injury. Remote history of pericarditis. \par Presented for cardiac follow up. Complaining of intermittent palpitations and occasional shortness of breath since receiving the Moderna vaccine. \par \par # intermittent palpitations\par    Placed a 7 day Zio holter patch to assess palpitation symptoms\par    Repeat echocardiogram to rule out repeat collection of pericardial fluid\par \par Most recent labs drawn one year ago.\par Repeating full panel: CBC, CMP, Hgb A1C, TSH, Lipid panel\par \par Encouraged patient to exercise 150 minutes per week\par \par Follow up post testing\par \par    \par

## 2021-12-10 NOTE — HISTORY OF PRESENT ILLNESS
[FreeTextEntry1] : Patient is a 62 yo woman with h/o pericarditis (on medical therapy - years ago), intracranial bleed after an injury,  presented to follow up.\par \par Presently feels well - occ feels SOB when she has chest pain/ discomfort especially when she is lying down relieved with Motrin. \par \par TTE - small pericardial effusion ( Prior reading was trace effusion 2017). \par Was advised to Motrin but has not started taking yet. \par \par Interval Note\par December 9, 2021\par \par 61  year old female with remote history of pericarditis. [facility].\par \par Blood pressures today is normotensive\par EKG - demonstrates sinus rhythm with low voltage, EKG is stable and unchanged from prior EKG one year ago. \par \par Patient reports that since she received the Moderna booster 6 weeks ago and  has been experiencing some palpations and dyspnea on exertion since that  time.  \par \par Otherwise, feels ok and denies symptoms of chest pain, dizziness or syncope.

## 2021-12-23 PROCEDURE — 93244 EXT ECG>48HR<7D REV&INTERPJ: CPT

## 2022-01-04 ENCOUNTER — APPOINTMENT (OUTPATIENT)
Dept: CARDIOLOGY | Facility: CLINIC | Age: 62
End: 2022-01-04
Payer: COMMERCIAL

## 2022-01-04 DIAGNOSIS — R00.2 PALPITATIONS: ICD-10-CM

## 2022-01-04 DIAGNOSIS — I31.3 PERICARDIAL EFFUSION (NONINFLAMMATORY): ICD-10-CM

## 2022-01-04 DIAGNOSIS — R07.9 CHEST PAIN, UNSPECIFIED: ICD-10-CM

## 2022-01-04 DIAGNOSIS — R06.00 DYSPNEA, UNSPECIFIED: ICD-10-CM

## 2022-01-04 PROCEDURE — 93306 TTE W/DOPPLER COMPLETE: CPT

## 2022-01-06 PROBLEM — R03.0 ELEVATED BLOOD PRESSURE READING WITHOUT DIAGNOSIS OF HYPERTENSION: Status: ACTIVE | Noted: 2022-01-06

## 2022-01-21 PROBLEM — R00.2 PALPITATIONS: Status: ACTIVE | Noted: 2017-05-15

## 2022-01-21 PROBLEM — R06.00 DYSPNEA: Status: ACTIVE | Noted: 2017-05-16

## 2022-01-21 PROBLEM — R07.9 CHEST PAIN: Status: ACTIVE | Noted: 2017-05-15

## 2022-01-21 PROBLEM — I31.3 PERICARDIAL EFFUSION: Status: ACTIVE | Noted: 2020-09-10

## 2022-06-09 ENCOUNTER — APPOINTMENT (OUTPATIENT)
Dept: ORTHOPEDIC SURGERY | Facility: CLINIC | Age: 62
End: 2022-06-09
Payer: COMMERCIAL

## 2022-06-09 VITALS — BODY MASS INDEX: 19.88 KG/M2 | HEIGHT: 62 IN | WEIGHT: 108 LBS

## 2022-06-09 DIAGNOSIS — M81.0 AGE-RELATED OSTEOPOROSIS W/OUT CURRENT PATHOLOGICAL FRACTURE: ICD-10-CM

## 2022-06-09 PROCEDURE — 99203 OFFICE O/P NEW LOW 30 MIN: CPT

## 2022-06-09 RX ORDER — MELOXICAM 15 MG/1
15 TABLET ORAL DAILY
Qty: 30 | Refills: 1 | Status: ACTIVE | COMMUNITY
Start: 2022-06-09 | End: 1900-01-01

## 2022-06-09 NOTE — HISTORY OF PRESENT ILLNESS
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 \par Pt denies numbness to the LLE. \par \par PMH: Osteoporosis.\par Allergies: Bactrim (rash) [] : Post Surgical Visit: no [FreeTextEntry1] : groin/left knee [FreeTextEntry5] : no recent injury. Patient is here today for ongoing pain in the left knee/groin. Pain worsens when patient is walking. Patient has a CT Scan done 2 weeks ago. [de-identified] : CT Scan [de-identified] : none

## 2022-06-09 NOTE — PHYSICAL EXAM
[de-identified] : Gait is mildly antalgic to the left.\par There is left groin pain noted with IROM.\par Mildly limited strength to the left hip upon flexion and adduction.\par There is TTP over the adductor muscle region.\par No pain with pelvic compression.\par Femoral Stretch Test produces mild groin pain.\par No TTP over the SI joints or left GT region. \par SLR tests are negative. \par Lower extremities are nvi with 5/5 strength.

## 2022-07-26 ENCOUNTER — APPOINTMENT (OUTPATIENT)
Dept: ORTHOPEDIC SURGERY | Facility: CLINIC | Age: 62
End: 2022-07-26

## 2022-07-26 VITALS — WEIGHT: 108 LBS | HEIGHT: 62 IN | BODY MASS INDEX: 19.88 KG/M2

## 2022-07-26 DIAGNOSIS — M76.892 OTHER SPECIFIED ENTHESOPATHIES OF LEFT LOWER LIMB, EXCLUDING FOOT: ICD-10-CM

## 2022-07-26 DIAGNOSIS — M25.552 PAIN IN LEFT HIP: ICD-10-CM

## 2022-07-26 PROCEDURE — 99213 OFFICE O/P EST LOW 20 MIN: CPT

## 2022-07-26 RX ORDER — DIAZEPAM 5 MG/1
5 TABLET ORAL
Qty: 2 | Refills: 0 | Status: ACTIVE | COMMUNITY
Start: 2022-07-26 | End: 1900-01-01

## 2022-07-26 RX ORDER — MELOXICAM 15 MG/1
15 TABLET ORAL DAILY
Qty: 30 | Refills: 2 | Status: ACTIVE | COMMUNITY
Start: 2022-07-26 | End: 1900-01-01

## 2022-07-26 NOTE — ASSESSMENT
[FreeTextEntry1] : Pt has not significantly improved with formal PT and nsaid and continues to be limited with ADLs.\par Will refer for MRI of the left hip for further assessment. \par Pt will rto with Dr. Flowers s/p MRI to discuss results/tx options.\par Pt provided refill of Mobic 15 mg/d prn pain, as well as Valium 5 mg 1-2 to be taken 1-2 hrs prior to MRI.\par Pt is aware that she is not to drive while on this medication.

## 2022-07-26 NOTE — HISTORY OF PRESENT ILLNESS
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 Pt states that she continues to have difficulty walking due (> 10 minutes) to this discomfort. \par \par 6/9/2022: Pt has note difficulty ambulating over the past 9 weeks due to left groin/leg pain.\par There is no hx of trauma. \par Pt denies numbness to the LLE. \par \par PMH: Osteoporosis.\par Allergies: Bactrim (rash) [] : Post Surgical Visit: no [FreeTextEntry1] : groin/left knee [FreeTextEntry5] : no recent injury. Patient is here today for ongoing pain in the left knee/groin. Pain worsens when patient is walking. Patient has a CT Scan done 2 weeks ago. [de-identified] : CT Scan [de-identified] : none

## 2022-07-26 NOTE — IMAGING
[de-identified] : Gait is mildly antalgic to the left.\par There is left groin pain noted with IROM and with adduction.\par Mildly limited strength to the left hip upon flexion and adduction.\par There is + TTP over the adductor muscle region proximally.\par No pain with pelvic compression.\par Femoral Stretch Test produces mild groin pain.\par No TTP over the SI joints or left GT region. \par SLR tests are negative. \par Lower extremities are nvi with 5/5 strength.

## 2022-08-10 ENCOUNTER — FORM ENCOUNTER (OUTPATIENT)
Age: 62
End: 2022-08-10

## 2022-08-11 ENCOUNTER — APPOINTMENT (OUTPATIENT)
Dept: MRI IMAGING | Facility: CLINIC | Age: 62
End: 2022-08-11

## 2022-08-11 PROCEDURE — 73721 MRI JNT OF LWR EXTRE W/O DYE: CPT | Mod: LT

## 2022-08-15 ENCOUNTER — APPOINTMENT (OUTPATIENT)
Dept: ORTHOPEDIC SURGERY | Facility: CLINIC | Age: 62
End: 2022-08-15

## 2022-08-15 VITALS — WEIGHT: 108 LBS | HEIGHT: 62 IN | BODY MASS INDEX: 19.88 KG/M2

## 2022-08-15 DIAGNOSIS — M16.12 UNILATERAL PRIMARY OSTEOARTHRITIS, LEFT HIP: ICD-10-CM

## 2022-08-15 DIAGNOSIS — S73.192S OTHER SPRAIN OF LEFT HIP, SEQUELA: ICD-10-CM

## 2022-08-15 PROCEDURE — 99214 OFFICE O/P EST MOD 30 MIN: CPT

## 2022-08-15 PROCEDURE — 99213 OFFICE O/P EST LOW 20 MIN: CPT

## 2022-08-15 NOTE — HISTORY OF PRESENT ILLNESS
[6] : 6 [2] : 2 [Dull/Aching] : dull/aching [de-identified] : 62F p/w L hip pain. She has been having pain in her groin since April, been doing PT without relief. She has not been using any NSAIDs. Originally seen by Dr Trent who sent her for an MRI. She notes i has gotten over the last few days from inactivity. [] : Post Surgical Visit: no [FreeTextEntry1] : left hip  [de-identified] : pt

## 2022-08-15 NOTE — ASSESSMENT
[FreeTextEntry1] : 62F p/w mild L hip OA and labrum tear\par \par Continue HEP and NSAIDs\par Discussed possible IA hip inj\par return 2-3mo if no improvement

## 2023-01-18 ENCOUNTER — APPOINTMENT (OUTPATIENT)
Dept: UROGYNECOLOGY | Facility: CLINIC | Age: 63
End: 2023-01-18

## 2023-05-15 ENCOUNTER — APPOINTMENT (OUTPATIENT)
Dept: CARDIOLOGY | Facility: CLINIC | Age: 63
End: 2023-05-15

## 2023-05-15 NOTE — DISCUSSION/SUMMARY
[FreeTextEntry1] :  61 year old woman with h/o pericarditis (on medical therapy - years ago), intracranial bleed after an injury. Remote history of pericarditis. \par    \par

## 2023-05-15 NOTE — HISTORY OF PRESENT ILLNESS
[FreeTextEntry1] : Patient is a 60 yo woman with h/o pericarditis (on medical therapy - years ago), intracranial bleed after an injury,  presented to follow up.\par \par Presently feels well - occ feels SOB when she has chest pain/ discomfort especially when she is lying down relieved with Motrin. \par \par TTE - small pericardial effusion ( Prior reading was trace effusion 2017). \par Was advised to Motrin but has not started taking yet. \par \par Interval note\par May 15, 2023\par Tiffany chaudhry is a 63 year old female with PMHx pericarditis, intracranial bleed s/p injury. she presents to  program for  her 6 months CV follow up visit.\par  BP\par ECG\par LABS:\par \par Interval Note\par December 9, 2021\par \par 61  year old female with remote history of pericarditis. [facility].\par \par Blood pressures today is normotensive\par EKG - demonstrates sinus rhythm with low voltage, EKG is stable and unchanged from prior EKG one year ago. \par \par Patient reports that since she received the Moderna booster 6 weeks ago and  has been experiencing some palpations and dyspnea on exertion since that  time.  \par \par Otherwise, feels ok and denies symptoms of chest pain, dizziness or syncope.

## 2023-07-27 DIAGNOSIS — J45.909 UNSPECIFIED ASTHMA, UNCOMPLICATED: ICD-10-CM

## 2023-07-27 RX ORDER — ALBUTEROL SULFATE 90 UG/1
108 (90 BASE) INHALANT RESPIRATORY (INHALATION) EVERY 4 HOURS
Refills: 0 | Status: ACTIVE | COMMUNITY
Start: 2023-07-27

## 2023-07-27 RX ORDER — AMLODIPINE BESYLATE 2.5 MG/1
2.5 TABLET ORAL
Qty: 30 | Refills: 3 | Status: DISCONTINUED | COMMUNITY
Start: 2022-01-06 | End: 2023-07-27

## 2023-08-01 ENCOUNTER — APPOINTMENT (OUTPATIENT)
Dept: ELECTROPHYSIOLOGY | Facility: CLINIC | Age: 63
End: 2023-08-01
Payer: COMMERCIAL

## 2023-08-02 ENCOUNTER — APPOINTMENT (OUTPATIENT)
Dept: UROGYNECOLOGY | Facility: CLINIC | Age: 63
End: 2023-08-02
Payer: COMMERCIAL

## 2023-08-02 VITALS
HEART RATE: 76 BPM | BODY MASS INDEX: 20.24 KG/M2 | DIASTOLIC BLOOD PRESSURE: 85 MMHG | HEIGHT: 62 IN | SYSTOLIC BLOOD PRESSURE: 123 MMHG | WEIGHT: 110 LBS

## 2023-08-02 DIAGNOSIS — Z87.09 PERSONAL HISTORY OF OTHER DISEASES OF THE RESPIRATORY SYSTEM: ICD-10-CM

## 2023-08-02 DIAGNOSIS — Z82.49 FAMILY HISTORY OF ISCHEMIC HEART DISEASE AND OTHER DISEASES OF THE CIRCULATORY SYSTEM: ICD-10-CM

## 2023-08-02 DIAGNOSIS — Z82.5 FAMILY HISTORY OF ASTHMA AND OTHER CHRONIC LOWER RESPIRATORY DISEASES: ICD-10-CM

## 2023-08-02 DIAGNOSIS — I51.9 HEART DISEASE, UNSPECIFIED: ICD-10-CM

## 2023-08-02 DIAGNOSIS — Z80.1 FAMILY HISTORY OF MALIGNANT NEOPLASM OF TRACHEA, BRONCHUS AND LUNG: ICD-10-CM

## 2023-08-02 DIAGNOSIS — Z87.828 PERSONAL HISTORY OF OTHER (HEALED) PHYSICAL INJURY AND TRAUMA: ICD-10-CM

## 2023-08-02 DIAGNOSIS — Z78.9 OTHER SPECIFIED HEALTH STATUS: ICD-10-CM

## 2023-08-02 DIAGNOSIS — R35.0 FREQUENCY OF MICTURITION: ICD-10-CM

## 2023-08-02 DIAGNOSIS — N39.41 URGE INCONTINENCE: ICD-10-CM

## 2023-08-02 DIAGNOSIS — R39.15 URGENCY OF URINATION: ICD-10-CM

## 2023-08-02 DIAGNOSIS — Z87.19 PERSONAL HISTORY OF OTHER DISEASES OF THE DIGESTIVE SYSTEM: ICD-10-CM

## 2023-08-02 DIAGNOSIS — N95.2 POSTMENOPAUSAL ATROPHIC VAGINITIS: ICD-10-CM

## 2023-08-02 LAB
BILIRUB UR QL STRIP: NORMAL
CLARITY UR: CLEAR
COLLECTION METHOD: NORMAL
GLUCOSE UR-MCNC: NORMAL
HCG UR QL: 0.2 EU/DL
HGB UR QL STRIP.AUTO: NORMAL
KETONES UR-MCNC: NORMAL
LEUKOCYTE ESTERASE UR QL STRIP: NORMAL
NITRITE UR QL STRIP: NORMAL
PH UR STRIP: 7
PROT UR STRIP-MCNC: NORMAL
SP GR UR STRIP: 1.01

## 2023-08-02 PROCEDURE — 51701 INSERT BLADDER CATHETER: CPT

## 2023-08-02 PROCEDURE — 99204 OFFICE O/P NEW MOD 45 MIN: CPT | Mod: 25

## 2023-08-02 RX ORDER — MIRABEGRON 50 MG/1
50 TABLET, FILM COATED, EXTENDED RELEASE ORAL
Qty: 30 | Refills: 6 | Status: ACTIVE | COMMUNITY
Start: 2023-08-02 | End: 1900-01-01

## 2023-08-02 RX ORDER — ESTRADIOL 10 UG/1
10 TABLET, FILM COATED VAGINAL
Qty: 24 | Refills: 3 | Status: ACTIVE | COMMUNITY
Start: 2023-08-02 | End: 1900-01-01

## 2023-08-02 NOTE — HISTORY OF PRESENT ILLNESS
[Rectal Prolapse] : mild [Unable To Restrain Bowel Movement] : moderate [Feelings Of Urinary Urgency] : severe [x3+] : nocturia three or more  times a night [Urinary Tract Infection] : severe [] : no [de-identified] : 5-6 times per week [de-identified] : 10-15 times [de-identified] : 2-4 [FreeTextEntry1] :    daily fluid intake: 6 c water, 1 c coffee, 1 c decaf coffee

## 2023-08-02 NOTE — PHYSICAL EXAM
[Chaperone Present] : A chaperone was present in the examining room during all aspects of the physical examination [Labia Majora] : were normal [Normal Appearance] : general appearance was normal [Atrophy] : atrophy [Exam Deferred] : was deferred [FreeTextEntry1] : General: Well, appearing. Alert and orientated. No acute distress HEENT: Normocephalic, atraumatic and extraocular muscles appear to be intact  Neck: Full range of motion, no obvious lymphadenopathy, deformities, or masses noted  Respiratory: Speaking in full sentences comfortably, normal work of breathing and no cough during visit Musculoskeletal: active full range of motion in extremities  Extremities: No upper extremity edema noted Skin: no obvious rash or skin lesions Neuro: Orientated X 3, speech is fluent, normal rate Psych: Normal mood and affect    [Tenderness] : ~T no ~M abdominal tenderness observed [Distended] : not distended [Dry Mucosa] : dry mucosa [No Bleeding] : there was no active vaginal bleeding [Normal] : normal [FreeTextEntry4] : severe atrophy [de-identified] : no significant prolapse

## 2023-08-02 NOTE — DISCUSSION/SUMMARY
[FreeTextEntry1] :  1. We discussed possible etiologies of her symptoms including overactive bladder. I recommend she start behavioral modifications and bladder training. Written instructions on how to perform bladder training were provided.  We reviewed medications for overactive bladder. She will start Myrbetriq 50 mg daily. Risks and side effects reviewed extensively.   We reviewed her severe atrophy. She has tried nonhormonal moisturizers without significant improvement. Will start low dose vaginal estrogen. r/b/a reviewed. instrucitons for use reviewed  She will RTO in 10-12 weeks for follow up or sooner if issues arise.   The following treatment plan was designed for this patient and provided to her in written form and reviewed extensively. Patient was given a copy to take home:   For Urinary Symptoms (frequency, urgency, difficulty holding urine): **Total fluids: 34-50 oz (1-1.5 Liters) per day   -Ex. 8 oz coffee or tea (NOT BOTH!), 2-3 bottles of water (Each bottle is 16.9 oz). No flavoring added. No seltzer or Pelligrino!  -Drink slowly throughout day, no binge drinking (each bottle of water should take you hours, not minutes) **Avoid: 2nd cup coffee or tea (even if decaf), alcohol, carbonation (soda, seltzer), spicy foods, citrus, artificial sweeteners, chocolate **Stop eating and drinking 2-3 hours before bedtime (sips ok)  -Try the above fluid changes and bladder training for 4 weeks. Start Myrbetriq 50 mg daily in the morning. You must continue the fluid changes while on medication. Medication may take 3-4 weeks to start working. Common side effects include: dry mouth, dry eyes, constipation. If side effects try to manage first: Dry eyes: lubricating eye drops Dry mouth: biotene mouth wash Constipation: Miralax  If not covered by insurance, call insurance company and get list of overactive bladder medications that are covered. Call my office with list (Mon-Fri) and we will change medication  Start low dose vaginal estrogen: Vagifem/Estradiol vaginal tablet. It comes in an applicator that is inserted into the vagina at bedtime. While lying in bed, gently insert the pre-filled applicator with vagifem tablet into the vagina ~2 inches inside the vagina. Hit the button to release the tablet into the vagina. Let the tablet sit inside the vagina overnight where it will absorb.   Directions: Use every night for 2 weeks (loading dose), then decrease to twice weekly at bedtime (Mon/Thursday). Only do loading dose when starting medication for first time. Once refill medication, do twice weekly only  This medication takes several weeks to start working If not covered by insurance, call insurance company and ask which form of vaginal estrogen is covered. Call my office (mon-fri) with name of medication so we can change Rx  [facility] info on OAB and BT provided

## 2023-08-03 PROBLEM — Z87.09 HISTORY OF ASTHMA: Status: RESOLVED | Noted: 2023-08-03 | Resolved: 2023-08-03

## 2023-08-03 PROBLEM — Z80.1 FAMILY HISTORY OF LUNG CANCER: Status: ACTIVE | Noted: 2023-08-03

## 2023-08-03 PROBLEM — Z78.9 NON-SMOKER: Status: ACTIVE | Noted: 2023-08-03

## 2023-08-03 PROBLEM — I51.9 HEART PROBLEM: Status: RESOLVED | Noted: 2023-08-03 | Resolved: 2023-08-03

## 2023-08-03 PROBLEM — Z82.5 FAMILY HISTORY OF ASTHMA: Status: ACTIVE | Noted: 2023-08-03

## 2023-08-03 PROBLEM — Z87.19 HISTORY OF CONSTIPATION: Status: RESOLVED | Noted: 2023-08-03 | Resolved: 2023-08-03

## 2023-08-03 PROBLEM — Z87.828 HISTORY OF HEAD INJURY: Status: RESOLVED | Noted: 2023-08-03 | Resolved: 2023-08-03

## 2023-08-03 PROBLEM — Z82.49 FAMILY HISTORY OF HYPERTENSION: Status: ACTIVE | Noted: 2023-08-03

## 2023-08-03 PROBLEM — Z87.09 HISTORY OF BRONCHITIS: Status: RESOLVED | Noted: 2023-08-03 | Resolved: 2023-08-03

## 2023-08-09 RX ORDER — TROSPIUM CHLORIDE 60 MG/1
60 CAPSULE, EXTENDED RELEASE ORAL
Qty: 30 | Refills: 2 | Status: DISCONTINUED | COMMUNITY
Start: 2023-08-09 | End: 2023-08-09

## 2023-08-15 PROCEDURE — 93244 EXT ECG>48HR<7D REV&INTERPJ: CPT

## 2023-08-16 ENCOUNTER — NON-APPOINTMENT (OUTPATIENT)
Age: 63
End: 2023-08-16

## 2023-08-29 ENCOUNTER — APPOINTMENT (OUTPATIENT)
Dept: CARDIOLOGY | Facility: CLINIC | Age: 63
End: 2023-08-29
Payer: COMMERCIAL

## 2023-08-29 PROCEDURE — 93306 TTE W/DOPPLER COMPLETE: CPT

## 2023-08-31 ENCOUNTER — NON-APPOINTMENT (OUTPATIENT)
Age: 63
End: 2023-08-31

## 2023-10-18 ENCOUNTER — APPOINTMENT (OUTPATIENT)
Dept: CARDIOLOGY | Facility: CLINIC | Age: 63
End: 2023-10-18
Payer: COMMERCIAL

## 2023-10-18 VITALS
OXYGEN SATURATION: 98 % | SYSTOLIC BLOOD PRESSURE: 115 MMHG | DIASTOLIC BLOOD PRESSURE: 76 MMHG | WEIGHT: 110 LBS | HEIGHT: 62 IN | BODY MASS INDEX: 20.24 KG/M2 | HEART RATE: 77 BPM

## 2023-10-18 PROCEDURE — 93000 ELECTROCARDIOGRAM COMPLETE: CPT

## 2023-10-18 PROCEDURE — 99214 OFFICE O/P EST MOD 30 MIN: CPT | Mod: 25

## 2023-11-01 ENCOUNTER — APPOINTMENT (OUTPATIENT)
Dept: UROGYNECOLOGY | Facility: CLINIC | Age: 63
End: 2023-11-01

## 2023-11-27 ENCOUNTER — APPOINTMENT (OUTPATIENT)
Dept: OBGYN | Facility: CLINIC | Age: 63
End: 2023-11-27

## 2023-12-14 ENCOUNTER — APPOINTMENT (OUTPATIENT)
Dept: OBGYN | Facility: CLINIC | Age: 63
End: 2023-12-14
Payer: COMMERCIAL

## 2023-12-14 PROCEDURE — 99396 PREV VISIT EST AGE 40-64: CPT

## 2023-12-19 NOTE — HISTORY OF PRESENT ILLNESS
[FreeTextEntry1] : Patient is a 64 yo woman with h/o pericarditis (on medical therapy - years ago), intracranial bleed after an injury,  presented to follow up.  Presently feels well - occ feels SOB when she has chest pain/ discomfort especially when she is lying down relieved with Motrin.   TTE - small pericardial effusion ( Prior reading was trace effusion 2017).  Was advised to Motrin but has not started taking yet.   Interval note 10/18/23  Tiffany chaudhry is a 63 year old female with PMHx pericarditis, intracranial bleed s/p injury. [facility] for  her 6 months CV follow up visit.  Blood pressures today is normotensive EKG - demonstrates sinus rhythm with low voltage, EKG is stable and unchanged from prior EKG one year ago.   Patient reports that since she received the Moderna booster 6 weeks ago and  has been experiencing some palpations and dyspnea on exertion since that  time.    Otherwise, feels ok and denies symptoms of chest pain, dizziness or syncope.

## 2023-12-19 NOTE — DISCUSSION/SUMMARY
[FreeTextEntry1] : Patient is a 62 yo woman with h/o pericarditis (on medical therapy - years ago), intracranial bleed after an injury,  presented to follow up.  Presently feels well - occ feels SOB when she has chest pain/ discomfort especially when she is lying down relieved with Motrin.   TTE - small pericardial effusion ( Prior reading was trace effusion 2017).  Was advised to Motrin but has not started taking yet.   Interval note 10/18/23  Tiffany chaudhry is a 63 year old female with PMHx pericarditis, intracranial bleed s/p injury. she presents to  program for  her 6 months CV follow up visit.  Blood pressures today is normotensive EKG - demonstrates sinus rhythm with low voltage, EKG is stable and unchanged from prior EKG one year ago.   Patient reports that since she received the Moderna booster 6 weeks ago and  has been experiencing some palpations and dyspnea on exertion since that  time.    Otherwise, feels ok and denies symptoms of chest pain, dizziness or syncope.    - BP stable - Encouraged the patient to monitor blood pressure at home, keep a log, and report results back to us for evaluation. Based on results, we will adjust the regimen as necessary. - ECG with no acute ischemic changes (stable from prior) - Encouraged the patient to find healthy outlets and coping mechanisms to help manage stress, such as physical activity/exercise, reducing workload if possible, spending time with family and friends, engaging in an enjoyable hobby, or using meditation or mindfulness techniques. - Encouraged patient to continue healthy exercise and eating habits, focusing on a Mediterranean style of eating and aiming for the recommended 150 minutes per week of moderate physical activity. - echo done 8/2023 - in the chart - reviewed at length with pt    [EKG obtained to assist in diagnosis and management of assessed problem(s)] : EKG obtained to assist in diagnosis and management of assessed problem(s)

## 2024-01-02 ENCOUNTER — APPOINTMENT (OUTPATIENT)
Dept: OBGYN | Facility: CLINIC | Age: 64
End: 2024-01-02
Payer: COMMERCIAL

## 2024-01-02 PROCEDURE — 76830 TRANSVAGINAL US NON-OB: CPT

## 2024-04-10 ENCOUNTER — APPOINTMENT (OUTPATIENT)
Dept: CARDIOLOGY | Facility: CLINIC | Age: 64
End: 2024-04-10

## 2024-05-23 ENCOUNTER — OFFICE (OUTPATIENT)
Dept: URBAN - METROPOLITAN AREA CLINIC 77 | Facility: CLINIC | Age: 64
Setting detail: OPHTHALMOLOGY
End: 2024-05-23
Payer: MEDICARE

## 2024-05-23 DIAGNOSIS — G43.009: ICD-10-CM

## 2024-05-23 DIAGNOSIS — H47.333: ICD-10-CM

## 2024-05-23 DIAGNOSIS — S06.A0XD: ICD-10-CM

## 2024-05-23 DIAGNOSIS — H43.813: ICD-10-CM

## 2024-05-23 DIAGNOSIS — H53.10: ICD-10-CM

## 2024-05-23 PROCEDURE — 92083 EXTENDED VISUAL FIELD XM: CPT | Performed by: OPHTHALMOLOGY

## 2024-05-23 PROCEDURE — 99204 OFFICE O/P NEW MOD 45 MIN: CPT | Performed by: OPHTHALMOLOGY

## 2024-05-23 PROCEDURE — 92250 FUNDUS PHOTOGRAPHY W/I&R: CPT | Performed by: OPHTHALMOLOGY

## 2024-05-23 ASSESSMENT — CONFRONTATIONAL VISUAL FIELD TEST (CVF)
OS_FINDINGS: FULL
OD_FINDINGS: FULL

## 2024-06-10 ENCOUNTER — APPOINTMENT (OUTPATIENT)
Dept: CARDIOLOGY | Facility: CLINIC | Age: 64
End: 2024-06-10
Payer: MEDICARE

## 2024-06-10 VITALS
WEIGHT: 110 LBS | HEART RATE: 83 BPM | HEIGHT: 62 IN | DIASTOLIC BLOOD PRESSURE: 83 MMHG | OXYGEN SATURATION: 99 % | BODY MASS INDEX: 20.24 KG/M2 | SYSTOLIC BLOOD PRESSURE: 125 MMHG

## 2024-06-10 PROCEDURE — 99204 OFFICE O/P NEW MOD 45 MIN: CPT

## 2024-06-10 PROCEDURE — 93000 ELECTROCARDIOGRAM COMPLETE: CPT

## 2024-06-10 NOTE — DISCUSSION/SUMMARY
[FreeTextEntry1] : Patient is a 63 yo woman with h/o pericarditis (on medical therapy - years ago), intracranial bleed after an injury, presented to follow up.  Presently feels well - occ feels SOB when she has chest pain/ discomfort especially when she is lying down relieved with Motrin.   TTE - small pericardial effusion ( Prior reading was trace effusion 2017).  Was advised to Motrin but has not started taking yet.   Interval note 6/10/24  Tiffany chaudhry is a 64 year old female with PMHx pericarditis, intracranial bleed s/p injury. she presents to  program for  her 6 months CV follow up visit with episodes of atypical chest discomfort - with associated fatigue and SOB  Blood pressures today is normotensive EKG - demonstrates sinus rhythm with low voltage, EKG is stable and unchanged from prior EKG one year ago.   Patient reports that since she received the Moderna booster 6 weeks ago and  has been experiencing some palpations and dyspnea on exertion since that  time.    Otherwise, feels ok and denies symptoms of dizziness or syncope.    - BP stable - Encouraged the patient to monitor blood pressure at home, keep a log, and report results back to us for evaluation. Based on results, we will adjust the regimen as necessary. - ECG with no acute ischemic changes (stable from prior) - will refer for an ecjo and nuclear stress test in setting of the chest pain - will refer for fasting blood work - Encouraged the patient to find healthy outlets and coping mechanisms to help manage stress, such as physical activity/exercise, reducing workload if possible, spending time with family and friends, engaging in an enjoyable hobby, or using meditation or mindfulness techniques. - Encouraged patient to continue healthy exercise and eating habits, focusing on a Mediterranean style of eating and aiming for the recommended 150 minutes per week of moderate physical activity. - echo done 8/2023 - in the chart - reviewed at length with pt    [EKG obtained to assist in diagnosis and management of assessed problem(s)] : EKG obtained to assist in diagnosis and management of assessed problem(s)

## 2024-06-10 NOTE — HISTORY OF PRESENT ILLNESS
[FreeTextEntry1] : Patient is a 65 yo woman with h/o pericarditis (on medical therapy - years ago), intracranial bleed after an injury, presented to follow up.  Presently feels well - occ feels SOB when she has chest pain/ discomfort especially when she is lying down relieved with Motrin.   TTE - small pericardial effusion ( Prior reading was trace effusion 2017).  Was advised to Motrin but has not started taking yet.   Interval note 6/10/24  Tiffany chaudhry is a 64 year old female with PMHx pericarditis, intracranial bleed s/p injury. [facility] for  her 6 months CV follow up visit with episodes of atypical chest discomfort - with associated fatigue and SOB  Blood pressures today is normotensive EKG - demonstrates sinus rhythm with low voltage, EKG is stable and unchanged from prior EKG one year ago.   Patient reports that since she received the Moderna booster 6 weeks ago and  has been experiencing some palpations and dyspnea on exertion since that  time.    Otherwise, feels ok and denies symptoms of chest pain, dizziness or syncope.

## 2024-06-11 LAB
ESTIMATED AVERAGE GLUCOSE: 114 MG/DL
HBA1C MFR BLD HPLC: 5.6 %
HCT VFR BLD CALC: 41.1 %
HGB BLD-MCNC: 13.6 G/DL
MCHC RBC-ENTMCNC: 30.1 PG
MCHC RBC-ENTMCNC: 33.1 GM/DL
MCV RBC AUTO: 90.9 FL
PLATELET # BLD AUTO: 236 K/UL
RBC # BLD: 4.52 M/UL
RBC # FLD: 12.5 %
WBC # FLD AUTO: 4.67 K/UL

## 2024-06-12 LAB
ALBUMIN SERPL ELPH-MCNC: 4.9 G/DL
ALP BLD-CCNC: 49 U/L
ALT SERPL-CCNC: 15 U/L
ANION GAP SERPL CALC-SCNC: 13 MMOL/L
AST SERPL-CCNC: 18 U/L
BILIRUB SERPL-MCNC: 0.5 MG/DL
BUN SERPL-MCNC: 26 MG/DL
CALCIUM SERPL-MCNC: 9.9 MG/DL
CHLORIDE SERPL-SCNC: 103 MMOL/L
CHOLEST SERPL-MCNC: 268 MG/DL
CO2 SERPL-SCNC: 27 MMOL/L
CREAT SERPL-MCNC: 0.71 MG/DL
EGFR: 94 ML/MIN/1.73M2
GLUCOSE SERPL-MCNC: 89 MG/DL
HDLC SERPL-MCNC: 82 MG/DL
LDLC SERPL CALC-MCNC: 174 MG/DL
NONHDLC SERPL-MCNC: 186 MG/DL
POTASSIUM SERPL-SCNC: 4.2 MMOL/L
PROT SERPL-MCNC: 7.4 G/DL
SODIUM SERPL-SCNC: 142 MMOL/L
TRIGL SERPL-MCNC: 70 MG/DL
TSH SERPL-ACNC: 4.08 UIU/ML

## 2024-06-20 ENCOUNTER — RESULT REVIEW (OUTPATIENT)
Age: 64
End: 2024-06-20

## 2024-06-20 ENCOUNTER — APPOINTMENT (OUTPATIENT)
Dept: CV DIAGNOSTICS | Facility: HOSPITAL | Age: 64
End: 2024-06-20

## 2024-06-20 ENCOUNTER — OUTPATIENT (OUTPATIENT)
Dept: OUTPATIENT SERVICES | Facility: HOSPITAL | Age: 64
LOS: 1 days | End: 2024-06-20
Payer: MEDICARE

## 2024-06-20 DIAGNOSIS — Z90.89 ACQUIRED ABSENCE OF OTHER ORGANS: Chronic | ICD-10-CM

## 2024-06-20 DIAGNOSIS — R07.9 CHEST PAIN, UNSPECIFIED: ICD-10-CM

## 2024-06-20 PROCEDURE — 93017 CV STRESS TEST TRACING ONLY: CPT

## 2024-06-20 PROCEDURE — A9500: CPT

## 2024-06-20 PROCEDURE — 78452 HT MUSCLE IMAGE SPECT MULT: CPT | Mod: MC

## 2024-06-20 PROCEDURE — 78452 HT MUSCLE IMAGE SPECT MULT: CPT | Mod: 26,MC

## 2024-06-20 PROCEDURE — 93016 CV STRESS TEST SUPVJ ONLY: CPT | Mod: MC

## 2024-06-20 PROCEDURE — 93018 CV STRESS TEST I&R ONLY: CPT | Mod: MC

## 2024-06-27 ENCOUNTER — APPOINTMENT (OUTPATIENT)
Dept: CARDIOLOGY | Facility: CLINIC | Age: 64
End: 2024-06-27
Payer: MEDICARE

## 2024-06-27 PROCEDURE — 93306 TTE W/DOPPLER COMPLETE: CPT

## 2024-08-28 ENCOUNTER — APPOINTMENT (OUTPATIENT)
Dept: OBGYN | Facility: CLINIC | Age: 64
End: 2024-08-28
Payer: MEDICARE

## 2024-08-28 PROCEDURE — 99203 OFFICE O/P NEW LOW 30 MIN: CPT

## 2024-09-09 ENCOUNTER — APPOINTMENT (OUTPATIENT)
Dept: OBGYN | Facility: CLINIC | Age: 64
End: 2024-09-09

## 2025-03-05 ENCOUNTER — APPOINTMENT (OUTPATIENT)
Dept: ELECTROPHYSIOLOGY | Facility: CLINIC | Age: 65
End: 2025-03-05

## 2025-03-26 PROCEDURE — 93244 EXT ECG>48HR<7D REV&INTERPJ: CPT

## 2025-03-27 ENCOUNTER — APPOINTMENT (OUTPATIENT)
Dept: CARDIOLOGY | Facility: CLINIC | Age: 65
End: 2025-03-27
Payer: MEDICARE

## 2025-03-27 ENCOUNTER — NON-APPOINTMENT (OUTPATIENT)
Age: 65
End: 2025-03-27

## 2025-03-27 VITALS
HEART RATE: 88 BPM | DIASTOLIC BLOOD PRESSURE: 83 MMHG | OXYGEN SATURATION: 98 % | WEIGHT: 110 LBS | BODY MASS INDEX: 20.12 KG/M2 | SYSTOLIC BLOOD PRESSURE: 131 MMHG

## 2025-03-27 PROCEDURE — 93000 ELECTROCARDIOGRAM COMPLETE: CPT

## 2025-03-27 PROCEDURE — 99214 OFFICE O/P EST MOD 30 MIN: CPT

## 2025-03-27 PROCEDURE — G2211 COMPLEX E/M VISIT ADD ON: CPT

## 2025-05-08 ENCOUNTER — APPOINTMENT (OUTPATIENT)
Dept: OBGYN | Facility: CLINIC | Age: 65
End: 2025-05-08

## 2025-05-08 PROCEDURE — G0101: CPT

## 2025-05-08 PROCEDURE — 99213 OFFICE O/P EST LOW 20 MIN: CPT | Mod: 25

## 2025-05-08 PROCEDURE — 99459 PELVIC EXAMINATION: CPT

## 2025-05-08 PROCEDURE — 99397 PER PM REEVAL EST PAT 65+ YR: CPT | Mod: GY

## 2025-08-14 ENCOUNTER — APPOINTMENT (OUTPATIENT)
Dept: OTOLARYNGOLOGY | Facility: CLINIC | Age: 65
End: 2025-08-14
Payer: MEDICARE

## 2025-08-14 ENCOUNTER — NON-APPOINTMENT (OUTPATIENT)
Age: 65
End: 2025-08-14

## 2025-08-14 VITALS — SYSTOLIC BLOOD PRESSURE: 117 MMHG | DIASTOLIC BLOOD PRESSURE: 77 MMHG | HEART RATE: 79 BPM | TEMPERATURE: 98 F

## 2025-08-14 DIAGNOSIS — J34.3 HYPERTROPHY OF NASAL TURBINATES: ICD-10-CM

## 2025-08-14 DIAGNOSIS — J34.89 OTHER SPECIFIED DISORDERS OF NOSE AND NASAL SINUSES: ICD-10-CM

## 2025-08-14 DIAGNOSIS — R09.89 OTHER SPECIFIED SYMPTOMS AND SIGNS INVOLVING THE CIRCULATORY AND RESPIRATORY SYSTEMS: ICD-10-CM

## 2025-08-14 DIAGNOSIS — J34.2 DEVIATED NASAL SEPTUM: ICD-10-CM

## 2025-08-14 PROCEDURE — 31231 NASAL ENDOSCOPY DX: CPT

## 2025-08-14 PROCEDURE — 99204 OFFICE O/P NEW MOD 45 MIN: CPT | Mod: 25

## 2025-08-14 RX ORDER — ELASTIC BANDAGE 1"X2.2YD
2300-700 BANDAGE TOPICAL
Qty: 1 | Refills: 0 | Status: ACTIVE | COMMUNITY
Start: 2025-08-14 | End: 1900-01-01

## 2025-08-14 RX ORDER — AMOXICILLIN AND CLAVULANATE POTASSIUM 875; 125 MG/1; MG/1
875-125 TABLET, COATED ORAL TWICE DAILY
Qty: 28 | Refills: 0 | Status: ACTIVE | COMMUNITY
Start: 2025-08-14 | End: 1900-01-01

## 2025-08-14 RX ORDER — FLUTICASONE PROPIONATE 50 UG/1
50 SPRAY NASAL DAILY
Qty: 1 | Refills: 2 | Status: ACTIVE | COMMUNITY
Start: 2025-08-14 | End: 1900-01-01

## 2025-08-14 RX ORDER — LORATADINE 5 MG/5 ML
0.05 SOLUTION, ORAL ORAL
Qty: 1 | Refills: 0 | Status: ACTIVE | COMMUNITY
Start: 2025-08-14 | End: 1900-01-01

## 2025-08-14 RX ORDER — METHYLPREDNISOLONE 4 MG/1
4 TABLET ORAL
Qty: 1 | Refills: 0 | Status: ACTIVE | COMMUNITY
Start: 2025-08-14 | End: 1900-01-01

## 2025-09-18 ENCOUNTER — APPOINTMENT (OUTPATIENT)
Dept: OTOLARYNGOLOGY | Facility: CLINIC | Age: 65
End: 2025-09-18